# Patient Record
Sex: FEMALE | Race: BLACK OR AFRICAN AMERICAN | HISPANIC OR LATINO | URBAN - METROPOLITAN AREA
[De-identification: names, ages, dates, MRNs, and addresses within clinical notes are randomized per-mention and may not be internally consistent; named-entity substitution may affect disease eponyms.]

---

## 2024-03-08 ENCOUNTER — APPOINTMENT (OUTPATIENT)
Age: 24
End: 2024-03-08
Payer: COMMERCIAL

## 2024-03-08 ENCOUNTER — OFFICE VISIT (OUTPATIENT)
Age: 24
End: 2024-03-08
Payer: COMMERCIAL

## 2024-03-08 VITALS
HEIGHT: 60 IN | RESPIRATION RATE: 18 BRPM | TEMPERATURE: 98.4 F | BODY MASS INDEX: 37.11 KG/M2 | SYSTOLIC BLOOD PRESSURE: 90 MMHG | WEIGHT: 189 LBS | HEART RATE: 80 BPM | DIASTOLIC BLOOD PRESSURE: 60 MMHG | OXYGEN SATURATION: 99 %

## 2024-03-08 DIAGNOSIS — L65.9 HAIR LOSS: ICD-10-CM

## 2024-03-08 DIAGNOSIS — R53.83 OTHER FATIGUE: Primary | ICD-10-CM

## 2024-03-08 PROCEDURE — 99203 OFFICE O/P NEW LOW 30 MIN: CPT | Performed by: EMERGENCY MEDICINE

## 2024-03-08 NOTE — PROGRESS NOTES
[facility]        NAME: Adria Rucker is a 23 y.o. female  : 2000    MRN: [id number]  DATE: 2024  TIME: 6:17 PM    Assessment and Plan   Other fatigue [R53.83]  1. Other fatigue        2. Hair loss        I discussed with patient that she will need further workup to include blood work to identify the cause of her present symptoms.  I advised that these blood tests would likely include tests for thyroid disorders, anemia, etc. I explained that we are unable to order or do those tests here in the Urgent Care but gave her the contact information for the primary care practice here.      Patient Instructions     Patient Instructions   Fatigue   WHAT YOU NEED TO KNOW:   Fatigue is mental and physical exhaustion that does not get better with rest. Fatigue may make daily activities difficult or cause extreme sleepiness. It is normal to feel tired sometimes, but long-term fatigue may be a sign of serious illness.  DISCHARGE INSTRUCTIONS:   Return to the emergency department if:   You have chest pain.     You have difficulty breathing.    Contact your healthcare provider if:   You have a cough that gets worse, or does not go away.     You see blood in your urine or bowel movement.     You have numbness or tingling around your mouth or in an arm or leg.     You faint, feel dizzy, or have vision changes.     You have swelling in your lymph nodes.     You are a woman and have vaginal bleeding that is not normal for you, or is not expected.     You lose weight without trying, or you have trouble eating.     You feel weak or have muscle pain.     You have pain or swelling in your joints.    You have questions or concerns about your condition or care.    Follow up with your healthcare provider as directed:  You may need more tests. Your healthcare provider may also refer you to a specialist. Write down your questions so you remember to ask them during your visits.  Manage fatigue:   Keep a fatigue  diary.  Include anything that makes you feel more tired or less tired. Bring the diary with you to follow-up visits with your provider.    Exercise as directed.  Exercise can help you feel more alert. Exercise can also help you manage stress or relieve depression. Try to get at least 30 minutes of exercise most days of the week.         Keep a regular sleep schedule.  Go to bed and wake up at the same times every day. Limit naps to 1 hour each day. A nap can improve fatigue, but a long nap may make it harder to go to sleep at night.    Plan and limit your activities.  Limit the number of activities such as shopping and cleaning you do each day. If possible, try to spread out your trips throughout the week. Plan ahead so you are not rushing to get something done. Only do activities that you have the energy to complete. Take breaks between activities. Ask for help if you need it. Another person may be able to drive you or help with daily activities.    Eat a variety of healthy foods.  Healthy foods include fruits, vegetables, whole-grain breads, low-fat dairy products, beans, lean meats, and fish. Good nutrition can help manage fatigue.         Limit caffeine and alcohol.  These can make it difficult to fall or stay asleep. Women should limit alcohol to 1 drink a day. Men should limit alcohol to 2 drinks a day. A drink of alcohol is 12 ounces of beer, 5 ounces of wine, or 1½ ounces of liquor. Ask our healthcare provider how much caffeine is safe for you.    Do not smoke.  Nicotine and other chemicals in cigarettes and cigars can cause lung damage and increase fatigue. Ask your healthcare provider for information if you currently smoke and need help to quit. E-cigarettes or smokeless tobacco still contain nicotine. Talk to your healthcare provider before you use these products.    © Copyright [employer] 2023 Information is for End User's use only and may not be sold, redistributed or otherwise used for commercial  purposes.  The above information is an  only. It is not intended as medical advice for individual conditions or treatments. Talk to your doctor, nurse or pharmacist before following any medical regimen to see if it is safe and effective for you.  Alopecia   AMBULATORY CARE:   Alopecia  is hair loss or balding. It may happen on any part of the body. There are many types of alopecia. Some types cause temporary hair loss and your hair will grow back. With other types, hair loss can get worse, or cause permanent hair loss.  Common symptoms that occur with alopecia:   Burning, tingling, or itchiness on your scalp    Hair that easily breaks    Problems with your fingernails or toenails, such as notching or pitting    Scales or flakes from the areas of hair loss    Swelling and redness on your scalp    Contact your healthcare provider if:   Your symptoms get worse even with treatment.     You have questions or concerns about your condition or care.    Treatment for alopecia  depends on the cause. You may need medicines that promote hair growth, work with your immune system, or increase your hormone levels. You may also need medicines that treat infections. A hair transplant may be your only treatment option. Your healthcare provider will talk to you about all options available for your type of alopecia.  Management:  Relief from alopecia depends on the cause of your symptoms and your treatment. Alopecia may go away and then come back. It also may continue, even with treatment. The following may help you manage alopecia:  Avoid hair and scalp trauma.  Use a soft-bristled hair brush and wide-toothed comb to protect your scalp from damage. Avoid the overuse of chemicals on your hair. Avoid hairstyles that pull your hair too much.    Eat healthy foods.  Hair loss can be caused by poor nutrition. Healthy foods include fruits, vegetables, whole-grain breads, low-fat dairy products, beans, lean meats, and fish. Eat  healthy snacks, such as low-fat yogurt, if you get hungry between meals.     Reduce stress.  Try to get enough sleep and daily exercise. Learn new ways to relax, such as deep breathing, meditation, and listening to music. These may help you cope with stressful events.    Follow up with your healthcare provider as directed:  Your healthcare provider may recommend that you see a specialist such as a dermatologist or endocrinologist. Write down your questions so you remember to ask them during your visits.  © Copyright [employer] 2023 Information is for End User's use only and may not be sold, redistributed or otherwise used for commercial purposes.  The above information is an  only. It is not intended as medical advice for individual conditions or treatments. Talk to your doctor, nurse or pharmacist before following any medical regimen to see if it is safe and effective for you.      Follow up with PCP in 3-5 days.  Proceed to  ER if symptoms worsen.    Chief Complaint     Chief Complaint   Patient presents with    Multiple Concerns     X1 month, fatigue, body aches, losing hair         History of Present Illness       Patient complains of fatigue, body aches and hair loss for the past 1 month.        Review of Systems   Review of Systems   Constitutional:  Positive for fatigue. Negative for appetite change, chills and fever.   HENT:  Negative for congestion, rhinorrhea, sinus pressure, sore throat and trouble swallowing.    Respiratory:  Negative for cough, chest tightness, shortness of breath and wheezing.    Cardiovascular:  Negative for chest pain.   Gastrointestinal:  Negative for nausea.   Musculoskeletal:  Positive for myalgias.   Skin:  Negative for pallor.   Hematological:  Negative for adenopathy.         Current Medications     No current outpatient medications on file.    Current Allergies     Allergies as of 03/08/2024 - Reviewed 03/08/2024   Allergen Reaction Noted    Penicillins Hives  03/08/2024            The following portions of the patient's history were reviewed and updated as appropriate: allergies, current medications, past family history, past medical history, past social history, past surgical history and problem list.     History reviewed. No pertinent past medical history.    History reviewed. No pertinent surgical history.    History reviewed. No pertinent family history.      Medications have been verified.        Objective   BP 90/60   Pulse 80   Temp 98.4 °F (36.9 °C)   Resp 18   Ht 5' (1.524 m)   Wt 85.7 kg (189 lb)   SpO2 99%   BMI 36.91 kg/m²        Physical Exam     Physical Exam  Vitals and nursing note reviewed.   Constitutional:       General: She is not in acute distress.     Appearance: She is well-developed. She is not ill-appearing or toxic-appearing.   HENT:      Head: Normocephalic and atraumatic.      Nose: Mucosal edema present. No congestion.      Mouth/Throat:      Pharynx: No oropharyngeal exudate or posterior oropharyngeal erythema.      Tonsils: No tonsillar abscesses.   Neck:      Comments: Thyroid not palpably enlarged  Cardiovascular:      Rate and Rhythm: Normal rate and regular rhythm.   Pulmonary:      Effort: Pulmonary effort is normal. No respiratory distress.      Breath sounds: No wheezing or rales.   Musculoskeletal:         General: No swelling.      Cervical back: Neck supple. No tenderness.   Skin:     General: Skin is warm and dry.      Findings: No rash.   Neurological:      Mental Status: She is alert and oriented to person, place, and time.   Psychiatric:         Mood and Affect: Mood normal.         Behavior: Behavior normal.         Thought Content: Thought content normal.         Judgment: Judgment normal.

## 2024-03-08 NOTE — PATIENT INSTRUCTIONS
Fatigue   WHAT YOU NEED TO KNOW:   Fatigue is mental and physical exhaustion that does not get better with rest. Fatigue may make daily activities difficult or cause extreme sleepiness. It is normal to feel tired sometimes, but long-term fatigue may be a sign of serious illness.  DISCHARGE INSTRUCTIONS:   Return to the emergency department if:   You have chest pain.     You have difficulty breathing.    Contact your healthcare provider if:   You have a cough that gets worse, or does not go away.     You see blood in your urine or bowel movement.     You have numbness or tingling around your mouth or in an arm or leg.     You faint, feel dizzy, or have vision changes.     You have swelling in your lymph nodes.     You are a woman and have vaginal bleeding that is not normal for you, or is not expected.     You lose weight without trying, or you have trouble eating.     You feel weak or have muscle pain.     You have pain or swelling in your joints.    You have questions or concerns about your condition or care.    Follow up with your healthcare provider as directed:  You may need more tests. Your healthcare provider may also refer you to a specialist. Write down your questions so you remember to ask them during your visits.  Manage fatigue:   Keep a fatigue diary.  Include anything that makes you feel more tired or less tired. Bring the diary with you to follow-up visits with your provider.    Exercise as directed.  Exercise can help you feel more alert. Exercise can also help you manage stress or relieve depression. Try to get at least 30 minutes of exercise most days of the week.         Keep a regular sleep schedule.  Go to bed and wake up at the same times every day. Limit naps to 1 hour each day. A nap can improve fatigue, but a long nap may make it harder to go to sleep at night.    Plan and limit your activities.  Limit the number of activities such as shopping and cleaning you do each day. If possible, try to  spread out your trips throughout the week. Plan ahead so you are not rushing to get something done. Only do activities that you have the energy to complete. Take breaks between activities. Ask for help if you need it. Another person may be able to drive you or help with daily activities.    Eat a variety of healthy foods.  Healthy foods include fruits, vegetables, whole-grain breads, low-fat dairy products, beans, lean meats, and fish. Good nutrition can help manage fatigue.         Limit caffeine and alcohol.  These can make it difficult to fall or stay asleep. Women should limit alcohol to 1 drink a day. Men should limit alcohol to 2 drinks a day. A drink of alcohol is 12 ounces of beer, 5 ounces of wine, or 1½ ounces of liquor. Ask our healthcare provider how much caffeine is safe for you.    Do not smoke.  Nicotine and other chemicals in cigarettes and cigars can cause lung damage and increase fatigue. Ask your healthcare provider for information if you currently smoke and need help to quit. E-cigarettes or smokeless tobacco still contain nicotine. Talk to your healthcare provider before you use these products.    © Copyright [employer] 2023 Information is for End User's use only and may not be sold, redistributed or otherwise used for commercial purposes.  The above information is an  only. It is not intended as medical advice for individual conditions or treatments. Talk to your doctor, nurse or pharmacist before following any medical regimen to see if it is safe and effective for you.  Alopecia   AMBULATORY CARE:   Alopecia  is hair loss or balding. It may happen on any part of the body. There are many types of alopecia. Some types cause temporary hair loss and your hair will grow back. With other types, hair loss can get worse, or cause permanent hair loss.  Common symptoms that occur with alopecia:   Burning, tingling, or itchiness on your scalp    Hair that easily breaks    Problems with your  fingernails or toenails, such as notching or pitting    Scales or flakes from the areas of hair loss    Swelling and redness on your scalp    Contact your healthcare provider if:   Your symptoms get worse even with treatment.     You have questions or concerns about your condition or care.    Treatment for alopecia  depends on the cause. You may need medicines that promote hair growth, work with your immune system, or increase your hormone levels. You may also need medicines that treat infections. A hair transplant may be your only treatment option. Your healthcare provider will talk to you about all options available for your type of alopecia.  Management:  Relief from alopecia depends on the cause of your symptoms and your treatment. Alopecia may go away and then come back. It also may continue, even with treatment. The following may help you manage alopecia:  Avoid hair and scalp trauma.  Use a soft-bristled hair brush and wide-toothed comb to protect your scalp from damage. Avoid the overuse of chemicals on your hair. Avoid hairstyles that pull your hair too much.    Eat healthy foods.  Hair loss can be caused by poor nutrition. Healthy foods include fruits, vegetables, whole-grain breads, low-fat dairy products, beans, lean meats, and fish. Eat healthy snacks, such as low-fat yogurt, if you get hungry between meals.     Reduce stress.  Try to get enough sleep and daily exercise. Learn new ways to relax, such as deep breathing, meditation, and listening to music. These may help you cope with stressful events.    Follow up with your healthcare provider as directed:  Your healthcare provider may recommend that you see a specialist such as a dermatologist or endocrinologist. Write down your questions so you remember to ask them during your visits.  © Copyright [employer] 2023 Information is for End User's use only and may not be sold, redistributed or otherwise used for commercial purposes.  The above information is  an  only. It is not intended as medical advice for individual conditions or treatments. Talk to your doctor, nurse or pharmacist before following any medical regimen to see if it is safe and effective for you.

## 2024-05-24 ENCOUNTER — TELEPHONE (OUTPATIENT)
Age: 24
End: 2024-05-24

## 2024-05-24 ENCOUNTER — OFFICE VISIT (OUTPATIENT)
Age: 24
End: 2024-05-24
Payer: COMMERCIAL

## 2024-05-24 ENCOUNTER — APPOINTMENT (OUTPATIENT)
Age: 24
End: 2024-05-24
Payer: COMMERCIAL

## 2024-05-24 VITALS
BODY MASS INDEX: 36.79 KG/M2 | WEIGHT: 187.39 LBS | TEMPERATURE: 97.5 F | HEART RATE: 80 BPM | DIASTOLIC BLOOD PRESSURE: 68 MMHG | OXYGEN SATURATION: 97 % | HEIGHT: 60 IN | SYSTOLIC BLOOD PRESSURE: 112 MMHG

## 2024-05-24 DIAGNOSIS — Z13.29 THYROID DISORDER SCREEN: ICD-10-CM

## 2024-05-24 DIAGNOSIS — Z76.89 ENCOUNTER TO ESTABLISH CARE: ICD-10-CM

## 2024-05-24 DIAGNOSIS — Z13.6 ENCOUNTER FOR SCREENING FOR CARDIOVASCULAR DISORDERS: ICD-10-CM

## 2024-05-24 DIAGNOSIS — L65.9 HAIR LOSS: ICD-10-CM

## 2024-05-24 DIAGNOSIS — Z76.89 ENCOUNTER TO ESTABLISH CARE: Primary | ICD-10-CM

## 2024-05-24 DIAGNOSIS — Z13.1 ENCOUNTER FOR SCREENING FOR DIABETES MELLITUS: ICD-10-CM

## 2024-05-24 DIAGNOSIS — Z13.0 SCREENING FOR DEFICIENCY ANEMIA: ICD-10-CM

## 2024-05-24 DIAGNOSIS — E66.9 OBESITY (BMI 30-39.9): ICD-10-CM

## 2024-05-24 DIAGNOSIS — Z12.4 SCREENING FOR CERVICAL CANCER: ICD-10-CM

## 2024-05-24 LAB
ALBUMIN SERPL BCP-MCNC: 4.4 G/DL (ref 3.5–5)
ALP SERPL-CCNC: 67 U/L (ref 34–104)
ALT SERPL W P-5'-P-CCNC: 14 U/L (ref 7–52)
ANION GAP SERPL CALCULATED.3IONS-SCNC: 8 MMOL/L (ref 4–13)
AST SERPL W P-5'-P-CCNC: 15 U/L (ref 13–39)
BASOPHILS # BLD AUTO: 0.08 THOUSANDS/ÂΜL (ref 0–0.1)
BASOPHILS NFR BLD AUTO: 1 % (ref 0–1)
BILIRUB SERPL-MCNC: 0.51 MG/DL (ref 0.2–1)
BUN SERPL-MCNC: 14 MG/DL (ref 5–25)
CALCIUM SERPL-MCNC: 9.7 MG/DL (ref 8.4–10.2)
CHLORIDE SERPL-SCNC: 104 MMOL/L (ref 96–108)
CHOLEST SERPL-MCNC: 192 MG/DL
CO2 SERPL-SCNC: 27 MMOL/L (ref 21–32)
CREAT SERPL-MCNC: 0.88 MG/DL (ref 0.6–1.3)
EOSINOPHIL # BLD AUTO: 0.11 THOUSAND/ÂΜL (ref 0–0.61)
EOSINOPHIL NFR BLD AUTO: 2 % (ref 0–6)
ERYTHROCYTE [DISTWIDTH] IN BLOOD BY AUTOMATED COUNT: 15.8 % (ref 11.6–15.1)
EST. AVERAGE GLUCOSE BLD GHB EST-MCNC: 111 MG/DL
FERRITIN SERPL-MCNC: 18 NG/ML (ref 11–307)
GFR SERPL CREATININE-BSD FRML MDRD: 94 ML/MIN/1.73SQ M
GLUCOSE P FAST SERPL-MCNC: 93 MG/DL (ref 65–99)
HBA1C MFR BLD: 5.5 %
HCT VFR BLD AUTO: 41.1 % (ref 34.8–46.1)
HDLC SERPL-MCNC: 40 MG/DL
HGB BLD-MCNC: 12.6 G/DL (ref 11.5–15.4)
IMM GRANULOCYTES # BLD AUTO: 0.02 THOUSAND/UL (ref 0–0.2)
IMM GRANULOCYTES NFR BLD AUTO: 0 % (ref 0–2)
IRON SATN MFR SERPL: 24 % (ref 15–50)
IRON SERPL-MCNC: 96 UG/DL (ref 50–212)
LDLC SERPL CALC-MCNC: 135 MG/DL (ref 0–100)
LYMPHOCYTES # BLD AUTO: 2.47 THOUSANDS/ÂΜL (ref 0.6–4.47)
LYMPHOCYTES NFR BLD AUTO: 34 % (ref 14–44)
MCH RBC QN AUTO: 23 PG (ref 26.8–34.3)
MCHC RBC AUTO-ENTMCNC: 30.7 G/DL (ref 31.4–37.4)
MCV RBC AUTO: 75 FL (ref 82–98)
MONOCYTES # BLD AUTO: 0.55 THOUSAND/ÂΜL (ref 0.17–1.22)
MONOCYTES NFR BLD AUTO: 8 % (ref 4–12)
NEUTROPHILS # BLD AUTO: 4.14 THOUSANDS/ÂΜL (ref 1.85–7.62)
NEUTS SEG NFR BLD AUTO: 55 % (ref 43–75)
NRBC BLD AUTO-RTO: 0 /100 WBCS
PLATELET # BLD AUTO: 346 THOUSANDS/UL (ref 149–390)
PMV BLD AUTO: 10.4 FL (ref 8.9–12.7)
POTASSIUM SERPL-SCNC: 4.3 MMOL/L (ref 3.5–5.3)
PROT SERPL-MCNC: 7.5 G/DL (ref 6.4–8.4)
RBC # BLD AUTO: 5.48 MILLION/UL (ref 3.81–5.12)
SODIUM SERPL-SCNC: 139 MMOL/L (ref 135–147)
TIBC SERPL-MCNC: 405 UG/DL (ref 250–450)
TRIGL SERPL-MCNC: 87 MG/DL
TSH SERPL DL<=0.05 MIU/L-ACNC: 1.5 UIU/ML (ref 0.45–4.5)
UIBC SERPL-MCNC: 309 UG/DL (ref 155–355)
WBC # BLD AUTO: 7.37 THOUSAND/UL (ref 4.31–10.16)

## 2024-05-24 PROCEDURE — 99204 OFFICE O/P NEW MOD 45 MIN: CPT | Performed by: NURSE PRACTITIONER

## 2024-05-24 PROCEDURE — 85025 COMPLETE CBC W/AUTO DIFF WBC: CPT

## 2024-05-24 PROCEDURE — 83036 HEMOGLOBIN GLYCOSYLATED A1C: CPT

## 2024-05-24 PROCEDURE — 80053 COMPREHEN METABOLIC PANEL: CPT

## 2024-05-24 PROCEDURE — 83550 IRON BINDING TEST: CPT

## 2024-05-24 PROCEDURE — 36415 COLL VENOUS BLD VENIPUNCTURE: CPT

## 2024-05-24 PROCEDURE — 80061 LIPID PANEL: CPT

## 2024-05-24 PROCEDURE — 83540 ASSAY OF IRON: CPT

## 2024-05-24 PROCEDURE — 84443 ASSAY THYROID STIM HORMONE: CPT

## 2024-05-24 PROCEDURE — 82728 ASSAY OF FERRITIN: CPT

## 2024-05-24 NOTE — ASSESSMENT & PLAN NOTE
"Struggling to loose weight despite exercise and diet improvements  Diets tried in the past: low carb and low sugar, increased protein and vegetables  Exercise: walking and 30 mins \"at home aerobic exercises\"  Patient reports gaining a lot of weight over the past year, not sure amount.   Denies soda and energy drink consumption.   Does not eat late in the day.     Will check lipid panel, A1c, metabolic panel, TSH  Furl placed to weight management per patient request  "

## 2024-05-24 NOTE — TELEPHONE ENCOUNTER
Patient's Mom called, said that her daughter is there and she was told that she cannot be seen. Mom wants to know why, transferred her to the office.

## 2024-05-24 NOTE — ASSESSMENT & PLAN NOTE
"Ongoing issue since Dec 2023  Noted to L side of scalp  Associated symptoms: fatigue despite \"getting good sleep\", myalgias   Patient reports hair loss improved approx 1 month ago  \"I think it was from my diet... I stopped eating a lot of sugar\"  Denies menstrual irregularities     Will check TSH and iron panel  Recommending OB/GYN evaluation to workup PCOS  Will refer to dermatology if etiology remains unclear  "

## 2024-05-24 NOTE — PROGRESS NOTES
"Ambulatory Visit  Name: Adria Rucker      : 2000      MRN: [id number]  Encounter Provider: LARRY Avendano  Encounter Date: 2024   Encounter department: [facility]    Assessment & Plan     Follow-up TBD pending lab results    Documentation for this encounter was completed with the assistance of dictation software.  Please excuse any grammatical errors.  Please contact the provider if any documentation clarification is necessary.      1. Encounter to establish care  -     CBC and differential; Future  -     Comprehensive metabolic panel; Future  -     Lipid Panel with Direct LDL reflex; Future  -     Hemoglobin A1C; Future  -     TSH, 3rd generation with Free T4 reflex; Future  -     Iron Panel (Includes Ferritin, Iron Sat%, Iron, and TIBC); Future  2. Obesity (BMI 30-39.9)  Assessment & Plan:  Struggling to loose weight despite exercise and diet improvements  Diets tried in the past: low carb and low sugar, increased protein and vegetables  Exercise: walking and 30 mins \"at home aerobic exercises\"  Patient reports gaining a lot of weight over the past year, not sure amount.   Denies soda and energy drink consumption.   Does not eat late in the day.     Will check lipid panel, A1c, metabolic panel, TSH  Furl placed to weight management per patient request  Orders:  -     Ambulatory Referral to Weight Management; Future  -     CBC and differential; Future  -     Comprehensive metabolic panel; Future  -     Lipid Panel with Direct LDL reflex; Future  -     Hemoglobin A1C; Future  -     TSH, 3rd generation with Free T4 reflex; Future  -     Iron Panel (Includes Ferritin, Iron Sat%, Iron, and TIBC); Future  -     Ambulatory referral to Obstetrics / Gynecology; Future  3. Hair loss  Assessment & Plan:  Ongoing issue since Dec 2023  Noted to L side of scalp  Associated symptoms: fatigue despite \"getting good sleep\", myalgias   Patient reports hair loss improved approx 1 " "month ago  \"I think it was from my diet... I stopped eating a lot of sugar\"  Denies menstrual irregularities     Will check TSH and iron panel  Recommending OB/GYN evaluation to workup PCOS  Will refer to dermatology if etiology remains unclear  Orders:  -     CBC and differential; Future  -     Comprehensive metabolic panel; Future  -     Lipid Panel with Direct LDL reflex; Future  -     Hemoglobin A1C; Future  -     TSH, 3rd generation with Free T4 reflex; Future  -     Iron Panel (Includes Ferritin, Iron Sat%, Iron, and TIBC); Future  -     Ambulatory referral to Obstetrics / Gynecology; Future  4. Encounter for screening for diabetes mellitus  -     Hemoglobin A1C; Future  5. Thyroid disorder screen  -     TSH, 3rd generation with Free T4 reflex; Future  6. Screening for deficiency anemia  -     CBC and differential; Future  -     Iron Panel (Includes Ferritin, Iron Sat%, Iron, and TIBC); Future  7. Encounter for screening for cardiovascular disorders  -     Comprehensive metabolic panel; Future  8. Screening for cervical cancer  -     Ambulatory referral to Obstetrics / Gynecology; Future       History of Present Illness   {Disappearing Hyperlinks I Encounters * My Last Note * Since Last Visit * History :52182}  Patient presents today to establish care.  Her primary concerns are weight gain and hair loss.  BMI today 36.6.  Other vital signs are stable.  Patient states \"back in the spring, I was really tired and having hair loss... The care now doctor told me I should have my thyroid checked\"        Review of Systems   Constitutional:  Positive for unexpected weight change.   HENT: Negative.     Eyes: Negative.    Respiratory: Negative.     Cardiovascular: Negative.    Gastrointestinal: Negative.    Endocrine: Negative.    Genitourinary: Negative.    Musculoskeletal: Negative.    Skin: Negative.         Hair loss   Allergic/Immunologic: Negative.    Neurological: Negative.    Hematological: Negative.  "   Psychiatric/Behavioral: Negative.         Objective   {Disappearing Hyperlinks   Review Vitals * Enter New Vitals * Results Review * Labs * Imaging * Cardiology * Procedures * Lung Cancer Screening :[id number]}  /68 (BP Location: Left arm, Patient Position: Sitting, Cuff Size: Standard)   Pulse 80   Temp 97.5 °F (36.4 °C) (Temporal)   Ht 5' (1.524 m)   Wt 85 kg (187 lb 6.3 oz)   SpO2 97%   BMI 36.60 kg/m²     Physical Exam  Constitutional:       General: She is not in acute distress.     Appearance: Normal appearance. She is obese. She is not ill-appearing, toxic-appearing or diaphoretic.   HENT:      Right Ear: Tympanic membrane normal.      Left Ear: Tympanic membrane normal.      Nose: Nose normal. No congestion.      Mouth/Throat:      Pharynx: No oropharyngeal exudate or posterior oropharyngeal erythema.   Eyes:      Extraocular Movements: Extraocular movements intact.      Conjunctiva/sclera: Conjunctivae normal.   Neck:      Thyroid: No thyroid mass, thyromegaly or thyroid tenderness.   Cardiovascular:      Rate and Rhythm: Normal rate and regular rhythm.      Heart sounds: Normal heart sounds.   Pulmonary:      Effort: Pulmonary effort is normal. No respiratory distress.      Breath sounds: Normal breath sounds. No wheezing.   Abdominal:      General: Abdomen is flat.   Musculoskeletal:         General: Normal range of motion.      Cervical back: Normal range of motion.   Lymphadenopathy:      Cervical: No cervical adenopathy.   Skin:     Findings: No erythema or rash.   Neurological:      Mental Status: She is alert and oriented to person, place, and time.   Psychiatric:         Mood and Affect: Mood normal.         Behavior: Behavior normal.       Administrative Statements {Disappearing Hyperlinks I  Level of Service * PCMH/PCSP:90642}

## 2024-05-24 NOTE — PATIENT INSTRUCTIONS
[facility]  [facility], No appointment needed  [address] in same suite as Steve  Mon-Fri 7am-1pm

## 2024-05-28 ENCOUNTER — TELEPHONE (OUTPATIENT)
Age: 24
End: 2024-05-28

## 2024-05-28 NOTE — TELEPHONE ENCOUNTER
----- Message from LARRY Avendano sent at 5/26/2024  1:50 PM EDT -----  Regarding: Labs  Please let the patient know the following:    - A majority of your lab work is normal.   - In particular, your thyroid level is normal.  - Your iron level is low but you are not anemic. I would recommend starting an iron supplement and taking 3x per week. If you are in agreement, please let me know and I will provide you with further details.   - Your LDL cholesterol is mildly elevated. However, the elevation noted is very minimal. Based on your low cardiac risk score and good overall health, no drastic interventions are necessary at this time. Simply continue to work on maintaining a healthy diet and exercise routine. If you have any questions or concerns regarding your lab results, please do not hesitate to reach out. Have a great day!  ----- Message -----  From: Lab, Background User  Sent: 5/24/2024   4:32 PM EDT  To: LARRY Avendano

## 2024-05-28 NOTE — TELEPHONE ENCOUNTER
Tried calling patient to give result notes, phone unable to receive calls at the time. No VM available

## 2024-10-01 ENCOUNTER — TELEPHONE (OUTPATIENT)
Dept: BARIATRICS | Facility: CLINIC | Age: 24
End: 2024-10-01